# Patient Record
Sex: MALE | Race: WHITE | Employment: FULL TIME | URBAN - METROPOLITAN AREA
[De-identification: names, ages, dates, MRNs, and addresses within clinical notes are randomized per-mention and may not be internally consistent; named-entity substitution may affect disease eponyms.]

---

## 2021-02-25 ENCOUNTER — APPOINTMENT (OUTPATIENT)
Dept: GENERAL RADIOLOGY | Age: 31
End: 2021-02-25
Payer: COMMERCIAL

## 2021-02-25 ENCOUNTER — HOSPITAL ENCOUNTER (EMERGENCY)
Age: 31
Discharge: HOME | End: 2021-02-25
Payer: COMMERCIAL

## 2021-02-25 VITALS
DIASTOLIC BLOOD PRESSURE: 79 MMHG | TEMPERATURE: 98.5 F | HEART RATE: 77 BPM | OXYGEN SATURATION: 98 % | WEIGHT: 165 LBS | SYSTOLIC BLOOD PRESSURE: 132 MMHG | HEIGHT: 71 IN | BODY MASS INDEX: 23.1 KG/M2 | RESPIRATION RATE: 18 BRPM

## 2021-02-25 DIAGNOSIS — R50.9 FEVER, UNSPECIFIED FEVER CAUSE: Primary | ICD-10-CM

## 2021-02-25 LAB
ALBUMIN SERPL-MCNC: 4.5 GM/DL (ref 3.4–5)
ALP BLD-CCNC: 55 IU/L (ref 40–129)
ALT SERPL-CCNC: 86 U/L (ref 10–40)
ANION GAP SERPL CALCULATED.3IONS-SCNC: 7 MMOL/L (ref 4–16)
AST SERPL-CCNC: 31 IU/L (ref 15–37)
BACTERIA: NEGATIVE /HPF
BASOPHILS ABSOLUTE: 0 K/CU MM
BASOPHILS RELATIVE PERCENT: 0.6 % (ref 0–1)
BILIRUB SERPL-MCNC: 0.4 MG/DL (ref 0–1)
BILIRUBIN URINE: NEGATIVE MG/DL
BLOOD, URINE: NEGATIVE
BUN BLDV-MCNC: 14 MG/DL (ref 6–23)
CALCIUM SERPL-MCNC: 9.5 MG/DL (ref 8.3–10.6)
CHLORIDE BLD-SCNC: 101 MMOL/L (ref 99–110)
CLARITY: CLEAR
CO2: 30 MMOL/L (ref 21–32)
COLOR: COLORLESS
CREAT SERPL-MCNC: 0.9 MG/DL (ref 0.9–1.3)
DIFFERENTIAL TYPE: ABNORMAL
EOSINOPHILS ABSOLUTE: 0.1 K/CU MM
EOSINOPHILS RELATIVE PERCENT: 1.1 % (ref 0–3)
GFR AFRICAN AMERICAN: >60 ML/MIN/1.73M2
GFR NON-AFRICAN AMERICAN: >60 ML/MIN/1.73M2
GLUCOSE BLD-MCNC: 88 MG/DL (ref 70–99)
GLUCOSE, URINE: NEGATIVE MG/DL
HCT VFR BLD CALC: 53.1 % (ref 42–52)
HEMOGLOBIN: 17.6 GM/DL (ref 13.5–18)
IMMATURE NEUTROPHIL %: 0.2 % (ref 0–0.43)
KETONES, URINE: NEGATIVE MG/DL
LEUKOCYTE ESTERASE, URINE: NEGATIVE
LYMPHOCYTES ABSOLUTE: 2.2 K/CU MM
LYMPHOCYTES RELATIVE PERCENT: 40.9 % (ref 24–44)
MCH RBC QN AUTO: 29.4 PG (ref 27–31)
MCHC RBC AUTO-ENTMCNC: 33.1 % (ref 32–36)
MCV RBC AUTO: 88.6 FL (ref 78–100)
MONOCYTES ABSOLUTE: 0.3 K/CU MM
MONOCYTES RELATIVE PERCENT: 5.6 % (ref 0–4)
NITRITE URINE, QUANTITATIVE: NEGATIVE
NUCLEATED RBC %: 0 %
PDW BLD-RTO: 11.5 % (ref 11.7–14.9)
PH, URINE: 7 (ref 5–8)
PLATELET # BLD: 239 K/CU MM (ref 140–440)
PMV BLD AUTO: 8.9 FL (ref 7.5–11.1)
POTASSIUM SERPL-SCNC: 4 MMOL/L (ref 3.5–5.1)
PROTEIN UA: NEGATIVE MG/DL
RBC # BLD: 5.99 M/CU MM (ref 4.6–6.2)
RBC URINE: ABNORMAL /HPF (ref 0–3)
SEGMENTED NEUTROPHILS ABSOLUTE COUNT: 2.8 K/CU MM
SEGMENTED NEUTROPHILS RELATIVE PERCENT: 51.6 % (ref 36–66)
SODIUM BLD-SCNC: 138 MMOL/L (ref 135–145)
SPECIFIC GRAVITY UA: 1 (ref 1–1.03)
TOTAL IMMATURE NEUTOROPHIL: 0.01 K/CU MM
TOTAL NUCLEATED RBC: 0 K/CU MM
TOTAL PROTEIN: 7.5 GM/DL (ref 6.4–8.2)
TRICHOMONAS: ABNORMAL /HPF
UROBILINOGEN, URINE: NEGATIVE MG/DL (ref 0.2–1)
WBC # BLD: 5.4 K/CU MM (ref 4–10.5)
WBC UA: <1 /HPF (ref 0–2)

## 2021-02-25 PROCEDURE — U0002 COVID-19 LAB TEST NON-CDC: HCPCS

## 2021-02-25 PROCEDURE — 85025 COMPLETE CBC W/AUTO DIFF WBC: CPT

## 2021-02-25 PROCEDURE — 81001 URINALYSIS AUTO W/SCOPE: CPT

## 2021-02-25 PROCEDURE — 87040 BLOOD CULTURE FOR BACTERIA: CPT

## 2021-02-25 PROCEDURE — 80053 COMPREHEN METABOLIC PANEL: CPT

## 2021-02-25 PROCEDURE — 71045 X-RAY EXAM CHEST 1 VIEW: CPT

## 2021-02-25 PROCEDURE — 99283 EMERGENCY DEPT VISIT LOW MDM: CPT

## 2021-02-25 NOTE — ED NOTES
Discharge instructions reviewed with patient. follow up was discussed.  Patient denies further questions and verbalizes understanding     Devorah, RN  02/25/21 9106

## 2021-02-25 NOTE — ED NOTES
Blondie Essex, 35year old male present to the ED with a fever that has been on and off for 2 weeks, Patient states he was tested for COVID last week and it came back negative today. Patient states that he has taken tylenol a couple times but he decided to let nature take its course and let it subside. Patient states the fever has been up to 102 constantly abebe in the last two weeks. Patient does not have any medical history at this time. Patient ambulated to triage room with a steady gate. No SOB, productive cough or painful urination. Patient states his neck has been a little stiff recently      [address] [facility]  [facility]  02/25/21 8490

## 2021-02-25 NOTE — ED PROVIDER NOTES
As physician-in-triage, I performed a medical screening history and physical exam on this patient. HISTORY OF PRESENT ILLNESS  Nikki Pool is a 35 y.o. male who presents complaining of persistent fevers of 100-102 for 2 weeks. No cough, sputum production or shortness of breath. No abdominal pain, nausea or vomiting. No dysuria. He had a Covid-19 test that was negative. Denies any remarkable sick contacts, travel or atypical exposures. He has taken tylenol once. He is not on any routine medications. He does endorse some mild posterior head pain. He denies any acute pain complaints. PHYSICAL EXAM  BP (!) 166/81   Pulse 79   Temp 98.2 °F (36.8 °C) (Oral)   Resp 18   Ht 5' 11\" (1.803 m)   Wt 165 lb (74.8 kg)   SpO2 99%   BMI 23.01 kg/m²     On exam, the patient appears in no acute distress. Speech is clear. Breathing is unlabored. Moves all extremities    Comment: Please note this report has been produced using speech recognition software and may contain errors related to that system including errors in grammar, punctuation, and spelling, as well as words and phrases that may be inappropriate. If there are any questions or concerns please feel free to contact the dictating provider for clarification.      Kadeem Schilling MD  02/25/21 7521

## 2021-02-25 NOTE — ED PROVIDER NOTES
Patient Identification  Karina Anglin is a 35 y.o. male    Chief Complaint  Fever (x2 weeks, no current fever. states has not taken any tylenol in 2 weeks. received covid results yesterday, negative)      HPI  (History provided by patient)  This is a 35 y.o. male who was brought in by self for chief complaint of fever. Onset was 2 days ago. States has been having a fever with T-max of 102, typically varies between [address]. Associated with abdominal discomfort for the first week that has resolved. Began having HA and neck pain 4-5 days ago which is very mild. Denies any falls, trauma, vomiting, chest pain or shortness of breath, coughing. No changes in weight.  + mild fatigue. He did have a saliva Covid screen with [facility] Huffman that he recently received results for which was negative. He denies any medical problems. He did not take any antipyretics today. REVIEW OF SYSTEMS    Constitutional:  Denies chills. + fever  HENT:  Denies sore throat or ear pain   Eyes: Denies vision changes, eye pain  Cardiovascular:  Denies chest pain, syncope  Respiratory:  Denies shortness of breath, cough   GI:  Denies nausea, vomiting  :  Denies dysuria, discharge  Musculoskeletal:  Denies back pain, joint pain  Skin:  Denies rash, pruritis  Neurologic:  Denies focal weakness, or sensory changes     See HPI and nursing notes for additional information     I have reviewed the following nursing documentation:  Allergies: No Known Allergies    Past medical history:  has no past medical history on file. Past surgical history:  has a past surgical history that includes Nose surgery (2010) and eye surgery. Home medications:   Prior to Admission medications    Not on File       Social history:  reports that he has never smoked. He has never used smokeless tobacco. He reports current alcohol use. He reports that he does not use drugs. Family history:  History reviewed. No pertinent family history.       Exam  BP 132/79   Pulse 77   Temp 98.5 °F (36.9 °C) (Oral)   Resp 18   Ht 5' 11\" (1.803 m)   Wt 165 lb (74.8 kg)   SpO2 98%   BMI 23.01 kg/m²   Nursing note and vitals reviewed. Constitutional: Well developed, well nourished. No acute distress. HENT:      Head: Normocephalic and atraumatic. Ears: External ears normal.      Nose: Nose normal.     Mouth: Membrane mucosa moist and pink. No posterior oropharynx erythema or tonsillar edema  Eyes: Anicteric sclera. No discharge, PERRL  Neck: Supple. Trachea midline. No meningismus. Cardiovascular: RRR, no murmurs, rubs, or gallops, radial pulses 2+ bilaterally. Pulmonary/Chest: Effort normal. No respiratory distress. CTAB. No stridor. No wheezes. No rales. Abdominal: Soft. Nontender to palpation. No distension. No guarding, rebound tenderness, or evidence of ascites. : No CVA tenderness. Musculoskeletal: Moves all extremities. No gross deformity. Neurological: Alert and oriented to person, place, and time. Normal muscle tone. Skin: Warm and dry. No rash. Psychiatric: Normal mood and affect. Behavior is normal.      Radiographs (if obtained):  [] The following radiograph was interpreted by myself in the absence of a radiologist:   [x] Radiologist's Report Reviewed:  XR CHEST PORTABLE   Final Result   1. No radiographic finding to account for patient's fever.                 Labs  Results for orders placed or performed during the hospital encounter of 02/25/21   Comprehensive Metabolic Panel w/ Reflex to MG   Result Value Ref Range    Sodium 138 135 - 145 MMOL/L    Potassium 4.0 3.5 - 5.1 MMOL/L    Chloride 101 99 - 110 mMol/L    CO2 30 21 - 32 MMOL/L    BUN 14 6 - 23 MG/DL    CREATININE 0.9 0.9 - 1.3 MG/DL    Glucose 88 70 - 99 MG/DL    Calcium 9.5 8.3 - 10.6 MG/DL    Albumin 4.5 3.4 - 5.0 GM/DL    Total Protein 7.5 6.4 - 8.2 GM/DL    Total Bilirubin 0.4 0.0 - 1.0 MG/DL    ALT 86 (H) 10 - 40 U/L    AST 31 15 - 37 IU/L    Alkaline Phosphatase 55 40 - 129 IU/L    GFR Non-African American >60 >60 mL/min/1.73m2    GFR African American >60 >60 mL/min/1.73m2    Anion Gap 7 4 - 16   CBC Auto Differential   Result Value Ref Range    WBC 5.4 4.0 - 10.5 K/CU MM    RBC 5.99 4.6 - 6.2 M/CU MM    Hemoglobin 17.6 13.5 - 18.0 GM/DL    Hematocrit 53.1 (H) 42 - 52 %    MCV 88.6 78 - 100 FL    MCH 29.4 27 - 31 PG    MCHC 33.1 32.0 - 36.0 %    RDW 11.5 (L) 11.7 - 14.9 %    Platelets 367 384 - 825 K/CU MM    MPV 8.9 7.5 - 11.1 FL    Differential Type AUTOMATED DIFFERENTIAL     Segs Relative 51.6 36 - 66 %    Lymphocytes % 40.9 24 - 44 %    Monocytes % 5.6 (H) 0 - 4 %    Eosinophils % 1.1 0 - 3 %    Basophils % 0.6 0 - 1 %    Segs Absolute 2.8 K/CU MM    Lymphocytes Absolute 2.2 K/CU MM    Monocytes Absolute 0.3 K/CU MM    Eosinophils Absolute 0.1 K/CU MM    Basophils Absolute 0.0 K/CU MM    Nucleated RBC % 0.0 %    Total Nucleated RBC 0.0 K/CU MM    Total Immature Neutrophil 0.01 K/CU MM    Immature Neutrophil % 0.2 0 - 0.43 %   Urinalysis Reflex to Culture    Specimen: Urine   Result Value Ref Range    Color, UA COLORLESS (A) YELLOW    Clarity, UA CLEAR CLEAR    Glucose, Urine NEGATIVE NEGATIVE MG/DL    Bilirubin Urine NEGATIVE NEGATIVE MG/DL    Ketones, Urine NEGATIVE NEGATIVE MG/DL    Specific Gravity, UA 1.004 1.001 - 1.035    Blood, Urine NEGATIVE NEGATIVE    pH, Urine 7.0 5.0 - 8.0    Protein, UA NEGATIVE NEGATIVE MG/DL    Urobilinogen, Urine NEGATIVE 0.2 - 1.0 MG/DL    Nitrite Urine, Quantitative NEGATIVE NEGATIVE    Leukocyte Esterase, Urine NEGATIVE NEGATIVE    RBC, UA NONE SEEN 0 - 3 /HPF    WBC, UA <1 0 - 2 /HPF    Bacteria, UA NEGATIVE NEGATIVE /HPF    Trichomonas, UA NONE SEEN NONE SEEN /HPF         MDM  Patient presents for fever for 2 weeks. He did not take any antipyretics today and has no fever on arrival.  He did have a Covid test performed which was negative but he reports spitting in a test tube as source of specimen, will repeat Covid test here.   He has no other real focus of symptoms sides very mild headache and neck pain. He has no meningismus on exam and overall appears very well, doubt bacterial meningitis. Laboratory work-up here reveals normal white blood cell count, normal differential, unremarkable chemistry panel, normal urinalysis. Blood cultures were sent by triage provider and are pending. Chest x-ray negative. Discussed all results with patient. Unclear source, at this time doubt malignancy or other metabolic problem, likely has infection that he is getting over, may have had viral meningitis that has significantly improved. I believe he is appropriate for outpatient work-up, provided list of PCPs for follow-up including [facility] resource [place] for close follow-up. I estimate there is LOW risk for EPIGLOTTITIS, SEPSIS, DEEP SPACE INFECTION OF THE NECK/THROAT/MOUTH, PERITONITIS, CHOLECYSTITIS, SEVERE CELLULITIS, NECROTIZING FASCIITIS, ENCEPHALITIS, PNEUMONIA, MENINGITIS, OR PYELONEPHRITIS, thus I consider the discharge disposition reasonable. Karina Anglin and I have discussed the diagnosis and risks, and we agree with discharging home to follow-up with their primary doctor. We also discussed returning to the Emergency Department immediately if new or worsening symptoms occur. We have discussed the symptoms which are most concerning (e.g., changing or worsening pain, trouble swallowing or breathing, neck stiffness, fever) that necessitate immediate return. I have independently evaluated this patient. Final Impression  1. Fever, unspecified fever cause        Blood pressure 132/79, pulse 77, temperature 98.5 °F (36.9 °C), temperature source Oral, resp. rate 18, height 5' 11\" (1.803 m), weight 165 lb (74.8 kg), SpO2 98 %. Disposition:  Discharge to home in stable condition. Patient was given scripts for the following medications. I counseled patient how to take these medications.      There are no discharge medications for

## 2021-02-25 NOTE — ED NOTES
Xray at bedside     [facility]  02/25/21 7765

## 2021-02-26 ENCOUNTER — CARE COORDINATION (OUTPATIENT)
Dept: CARE COORDINATION | Age: 31
End: 2021-02-26

## 2021-02-26 LAB
SARS-COV-2: NOT DETECTED
SOURCE: NORMAL

## 2021-02-26 NOTE — CARE COORDINATION
Call to check on pt status after recent ER visit for fever, headache. Patient contacted regarding Carli Baxter. Discussed COVID-19 related testing which was pending at this time. Test results were pending. Patient informed of results, if available? Advised pt of pending test result & how to access via [id number] Sonia Flores Nurse/ [address] [place] [address] contacted the patient by telephone to perform post discharge assessment. Call within 2 business days of discharge: Yes. Verified name and  with patient as identifiers. Provided introduction to self, and explanation of the CTN/ACM role, and reason for call due to risk factors for infection and/or exposure to COVID-19. Symptoms reviewed with patient who verbalized the following symptoms: fever and headache. Due to no new or worsening symptoms encounter was not routed to provider for escalation. Discussed follow-up appointments. If no appointment was previously scheduled, appointment scheduling offered: Yes  [facility] follow [facility] appointment(s): No future appointments. Non-[facility] follow up appointment(s): n/a    Non-face-to-face services provided:  Obtained and reviewed discharge summary and/or continuity of care documents  Reviewed and followed up on pending diagnostic tests and treatments-COVID19, cultres     Advance Care Planning:   Does patient have an Advance Directive:  not on file. Patient has following risk factors of: no known risk factors. CTN/ACM reviewed discharge instructions, medical action plan and red flags such as increased shortness of breath, increasing fever and signs of decompensation with patient who verbalized understanding. Discussed exposure protocols and quarantine with CDC Guidelines What to do if you are sick with coronavirus disease .  Patient was given an opportunity for questions and concerns.  The patient agrees to contact the [facility] exposure line [phone], local [facility] department [place] [facility]: ([phone]) and PCP office for questions related to their healthcare. CTN/ACM provided contact information for future needs. Reviewed and educated patient on any new and changed medications related to discharge diagnosis     Patient/family/caregiver given information for [facility] Loop and agrees to enroll no  Patient's preferred e-mail: n/a   Patient's preferred phone number: n/a  Based on Loop alert triggers, patient will be contacted by nurse care manager for worsening symptoms. Plan for follow-up call in 5-7 days based on severity of symptoms and risk factors. Spoke with pt who reports temperature remains at about 99 with no tylenol. He states that his head & neck pain are also improved. He denies any new symptoms or concerns at present. Reviewed quarantine guidelines pending negative COVID test.    NABIL Hair RN  Ambulatory Care Manager  [phone] office/cell  [phone] fax  [email or web address]

## 2021-03-03 LAB
CULTURE: NORMAL
CULTURE: NORMAL
Lab: NORMAL
Lab: NORMAL
SPECIMEN: NORMAL
SPECIMEN: NORMAL

## 2021-03-05 ENCOUNTER — CARE COORDINATION (OUTPATIENT)
Dept: CARE COORDINATION | Age: 31
End: 2021-03-05

## 2021-03-05 NOTE — CARE COORDINATION
Outreach for 1 week ER follow up. LM with ACM contact information & requested a call back. CLAIR VidalN RN  Ambulatory Care Manager  [phone] office/cell  [phone] fax  [email or web address]

## 2021-03-12 ENCOUNTER — CARE COORDINATION (OUTPATIENT)
Dept: CARE COORDINATION | Age: 31
End: 2021-03-12

## 2021-03-12 NOTE — CARE COORDINATION
Outreach for final ED f/u, LM with ACM contact information requesting a call back. No further outreach is planned.